# Patient Record
Sex: FEMALE | Race: WHITE | Employment: OTHER | URBAN - METROPOLITAN AREA
[De-identification: names, ages, dates, MRNs, and addresses within clinical notes are randomized per-mention and may not be internally consistent; named-entity substitution may affect disease eponyms.]

---

## 2024-08-09 PROBLEM — F33.9 MAJOR DEPRESSIVE DISORDER, RECURRENT (CMS-HCC): Status: ACTIVE | Noted: 2024-08-09

## 2024-08-09 PROBLEM — I10 HYPERTENSION, ESSENTIAL: Status: ACTIVE | Noted: 2024-08-09

## 2024-08-09 PROBLEM — G30.9 ALZHEIMER'S DISEASE (MULTI): Status: ACTIVE | Noted: 2024-08-09

## 2024-08-09 PROBLEM — F02.80 ALZHEIMER'S DISEASE (MULTI): Status: ACTIVE | Noted: 2024-08-09

## 2024-08-09 PROBLEM — M62.81 MUSCLE WEAKNESS (GENERALIZED): Status: ACTIVE | Noted: 2024-08-09

## 2024-08-09 PROBLEM — R41.81 AGE-RELATED COGNITIVE DECLINE: Status: ACTIVE | Noted: 2024-08-09

## 2024-08-09 PROBLEM — R41.841 COGNITIVE COMMUNICATION DEFICIT: Status: ACTIVE | Noted: 2024-08-09

## 2024-08-09 PROBLEM — F41.9 ANXIETY DISORDER: Status: ACTIVE | Noted: 2024-08-09

## 2024-08-09 PROBLEM — R13.11 DYSPHAGIA, ORAL PHASE: Status: ACTIVE | Noted: 2024-08-09

## 2024-08-09 PROBLEM — M48.061 SPINAL STENOSIS, LUMBAR REGION, WITHOUT NEUROGENIC CLAUDICATION: Status: ACTIVE | Noted: 2024-08-09

## 2024-08-09 PROBLEM — Z74.1 NEED FOR ASSISTANCE WITH PERSONAL CARE: Status: ACTIVE | Noted: 2024-08-09

## 2024-08-09 PROBLEM — Z86.73 PERSONAL HISTORY OF TRANSIENT ISCHEMIC ATTACK (TIA), AND CEREBRAL INFARCTION WITHOUT RESIDUAL DEFICITS: Status: ACTIVE | Noted: 2024-08-09

## 2024-08-09 PROBLEM — M54.16 LUMBAR RADICULOPATHY: Status: ACTIVE | Noted: 2024-08-09

## 2024-08-09 PROBLEM — I73.00 RAYNAUD'S DISEASE WITHOUT GANGRENE: Status: ACTIVE | Noted: 2024-08-09

## 2024-08-09 PROBLEM — I69.354 HEMIPLEGIA AND HEMIPARESIS FOLLOWING CEREBRAL INFARCTION AFFECTING LEFT NON-DOMINANT SIDE (MULTI): Status: ACTIVE | Noted: 2024-08-09

## 2024-08-09 PROBLEM — R26.81 UNSTEADINESS ON FEET: Status: ACTIVE | Noted: 2024-08-09

## 2024-08-09 PROBLEM — R29.3 ABNORMAL POSTURE: Status: ACTIVE | Noted: 2024-08-09

## 2024-08-09 PROBLEM — M19.90 OSTEOARTHRITIS: Status: ACTIVE | Noted: 2024-08-09

## 2024-08-09 PROBLEM — K21.9 GERD WITHOUT ESOPHAGITIS: Status: ACTIVE | Noted: 2024-08-09

## 2024-08-09 PROBLEM — E11.40 TYPE 2 DIABETES MELLITUS WITH DIABETIC NEUROPATHY (MULTI): Status: ACTIVE | Noted: 2024-08-09

## 2024-08-09 PROBLEM — I63.9 CEREBRAL INFARCTION (MULTI): Status: ACTIVE | Noted: 2024-08-09

## 2024-12-20 ENCOUNTER — NURSING HOME VISIT (OUTPATIENT)
Facility: EXTERNAL LOCATION | Age: 84
End: 2024-12-20
Payer: MEDICARE

## 2024-12-20 DIAGNOSIS — G30.9 ALZHEIMER'S DISEASE: ICD-10-CM

## 2024-12-20 DIAGNOSIS — F02.80 ALZHEIMER'S DISEASE: ICD-10-CM

## 2024-12-20 DIAGNOSIS — E11.40 TYPE 2 DIABETES MELLITUS WITH DIABETIC NEUROPATHY, WITHOUT LONG-TERM CURRENT USE OF INSULIN: ICD-10-CM

## 2024-12-20 DIAGNOSIS — I10 HYPERTENSION, ESSENTIAL: Primary | ICD-10-CM

## 2024-12-20 PROCEDURE — 99305 1ST NF CARE MODERATE MDM 35: CPT | Performed by: INTERNAL MEDICINE

## 2024-12-20 NOTE — PROGRESS NOTES
Name: Genet Foote  YOB: 1940    INITIAL VISIT: [facility],     [facility] [place]   Patient seen and examined here at [facility]  She is awake and alert and pleasant at this time  She is able to answer some simple questions  She denies any major complaints at this time    REVIEW OF SYSTEMS:  Review of systems are negative except where noted in HPI.    There were no vitals taken for this visit.   Vitals reviewed and stable at this time  Physical Exam  Constitutional:       Appearance: Normal appearance.   HENT:      Head: Normocephalic and atraumatic.      Right Ear: External ear normal.      Left Ear: External ear normal.      Nose: Nose normal.      Mouth/Throat:      Mouth: Mucous membranes are moist.      Pharynx: Oropharynx is clear.   Eyes:      Extraocular Movements: Extraocular movements intact.      Conjunctiva/sclera: Conjunctivae normal.      Pupils: Pupils are equal, round, and reactive to light.   Cardiovascular:      Rate and Rhythm: Normal rate and regular rhythm.   Pulmonary:      Effort: Pulmonary effort is normal.      Breath sounds: Normal breath sounds.   Abdominal:      General: Abdomen is flat.      Palpations: Abdomen is soft.   Skin:     General: Skin is warm and dry.   Neurological:      General: No focal deficit present.      Mental Status: She is alert and oriented to person, place, and time.   Psychiatric:         Mood and Affect: Mood normal.         Behavior: Behavior normal.            CODE STATUS: Full code      No visits with results within 1 Week(s) from this visit.   Latest known visit with results is:   No results found for any previous visit.       LABORATORIES OR DIAGNOSTIC TESTS DONE/REVIEWED IN FACILITY:    Not on File      MEDICATIONS RECONCILED AT THE FACILITY:  Please see Nursing facility Medication EMR for full updated list.    Assessment/Plan    Problem List Items Addressed This Visit       Type 2 diabetes mellitus with diabetic neuropathy (Multi)    Alzheimer's  disease    Hypertension, essential - Primary   Plan:  At this time vitals are stable she appears to be on appropriate medications and seems to be tolerating them well  She is a full code  We will continue her medical management as she is on  Continue supportive measures        Ruben Jones, DO

## 2024-12-20 NOTE — LETTER
Patient: Genet Foote  : 1940    Encounter Date: 2024    Name: Genet Foote  YOB: 1940    INITIAL VISIT: [facility],     [facility]   Patient seen and examined here at [facility]  She is awake and alert and pleasant at this time  She is able to answer some simple questions  She denies any major complaints at this time    REVIEW OF SYSTEMS:  Review of systems are negative except where noted in HPI.    There were no vitals taken for this visit.   Vitals reviewed and stable at this time  Physical Exam  Constitutional:       Appearance: Normal appearance.   HENT:      Head: Normocephalic and atraumatic.      Right Ear: External ear normal.      Left Ear: External ear normal.      Nose: Nose normal.      Mouth/Throat:      Mouth: Mucous membranes are moist.      Pharynx: Oropharynx is clear.   Eyes:      Extraocular Movements: Extraocular movements intact.      Conjunctiva/sclera: Conjunctivae normal.      Pupils: Pupils are equal, round, and reactive to light.   Cardiovascular:      Rate and Rhythm: Normal rate and regular rhythm.   Pulmonary:      Effort: Pulmonary effort is normal.      Breath sounds: Normal breath sounds.   Abdominal:      General: Abdomen is flat.      Palpations: Abdomen is soft.   Skin:     General: Skin is warm and dry.   Neurological:      General: No focal deficit present.      Mental Status: She is alert and oriented to person, place, and time.   Psychiatric:         Mood and Affect: Mood normal.         Behavior: Behavior normal.            CODE STATUS: Full code      No visits with results within 1 Week(s) from this visit.   Latest known visit with results is:   No results found for any previous visit.       LABORATORIES OR DIAGNOSTIC TESTS DONE/REVIEWED IN FACILITY:    Not on File      MEDICATIONS RECONCILED AT THE FACILITY:  Please see Nursing facility Medication EMR for full updated list.    Assessment/Plan   Problem List Items Addressed This Visit       Type  2 diabetes mellitus with diabetic neuropathy (Multi)    Alzheimer's disease    Hypertension, essential - Primary   Plan:  At this time vitals are stable she appears to be on appropriate medications and seems to be tolerating them well  She is a full code  We will continue her medical management as she is on  Continue supportive measures        Ruben Jones DO       Electronically Signed By: Ruben Jones DO   12/20/24 10:52 AM

## 2025-02-25 ENCOUNTER — NURSING HOME VISIT (OUTPATIENT)
Facility: EXTERNAL LOCATION | Age: 85
End: 2025-02-25
Payer: MEDICARE

## 2025-02-25 DIAGNOSIS — E11.40 TYPE 2 DIABETES MELLITUS WITH DIABETIC NEUROPATHY, WITHOUT LONG-TERM CURRENT USE OF INSULIN: ICD-10-CM

## 2025-02-25 DIAGNOSIS — G30.9 ALZHEIMER'S DISEASE: ICD-10-CM

## 2025-02-25 DIAGNOSIS — F02.80 ALZHEIMER'S DISEASE: ICD-10-CM

## 2025-02-25 PROCEDURE — 99308 SBSQ NF CARE LOW MDM 20: CPT | Performed by: INTERNAL MEDICINE

## 2025-02-25 NOTE — PROGRESS NOTES
Name: Genet Foote  YOB: 1940    FOLLOW UP VISIT: SNF,     SUBJECTIVE:  Patient seen and examined here at [facility] for follow-up  She is resting comfortably in bed  She does wake up and answer some very simple questions  Denies any complaints at this time  REVIEW OF SYSTEMS:   All review of systems are negative unless otherwise stated above under subjective.    LABS REVIEWED AT FACILITY:       OBJECTIVE:  Vitals reviewed and stable  Physical Exam  Constitutional:       Appearance: Normal appearance.   HENT:      Head: Normocephalic and atraumatic.      Right Ear: External ear normal.      Left Ear: External ear normal.      Nose: Nose normal.      Mouth/Throat:      Mouth: Mucous membranes are moist.      Pharynx: Oropharynx is clear.   Eyes:      Extraocular Movements: Extraocular movements intact.      Conjunctiva/sclera: Conjunctivae normal.      Pupils: Pupils are equal, round, and reactive to light.   Cardiovascular:      Rate and Rhythm: Normal rate and regular rhythm.   Pulmonary:      Effort: Pulmonary effort is normal.      Breath sounds: Normal breath sounds.   Abdominal:      General: Abdomen is flat.      Palpations: Abdomen is soft.   Skin:     General: Skin is warm and dry.   Neurological:      General: No focal deficit present.      Mental Status: She is alert and oriented to person, place, and time.   Psychiatric:         Mood and Affect: Mood normal.         Behavior: Behavior normal.         Assessment/Plan   Problem List Items Addressed This Visit       Type 2 diabetes mellitus with diabetic neuropathy (Multi)    Alzheimer's disease   Plan:  At this time clinically she appears stable  Medications are reviewed  She is a full code  We will continue her medical management and supportive measures as she is on      Ruben Jones DO

## 2025-02-25 NOTE — LETTER
Patient: Genet Foote  : 1940    Encounter Date: 2025    Name: Genet Foote  YOB: 1940    FOLLOW UP VISIT: SNF,     SUBJECTIVE:  Patient seen and examined here at [facility] [place] for follow-up  She is resting comfortably in bed  She does wake up and answer some very simple questions  Denies any complaints at this time  REVIEW OF SYSTEMS:   All review of systems are negative unless otherwise stated above under subjective.    LABS REVIEWED AT FACILITY:       OBJECTIVE:  Vitals reviewed and stable  Physical Exam  Constitutional:       Appearance: Normal appearance.   HENT:      Head: Normocephalic and atraumatic.      Right Ear: External ear normal.      Left Ear: External ear normal.      Nose: Nose normal.      Mouth/Throat:      Mouth: Mucous membranes are moist.      Pharynx: Oropharynx is clear.   Eyes:      Extraocular Movements: Extraocular movements intact.      Conjunctiva/sclera: Conjunctivae normal.      Pupils: Pupils are equal, round, and reactive to light.   Cardiovascular:      Rate and Rhythm: Normal rate and regular rhythm.   Pulmonary:      Effort: Pulmonary effort is normal.      Breath sounds: Normal breath sounds.   Abdominal:      General: Abdomen is flat.      Palpations: Abdomen is soft.   Skin:     General: Skin is warm and dry.   Neurological:      General: No focal deficit present.      Mental Status: She is alert and oriented to person, place, and time.   Psychiatric:         Mood and Affect: Mood normal.         Behavior: Behavior normal.         Assessment/Plan  Problem List Items Addressed This Visit       Type 2 diabetes mellitus with diabetic neuropathy (Multi)    Alzheimer's disease   Plan:  At this time clinically she appears stable  Medications are reviewed  She is a full code  We will continue her medical management and supportive measures as she is on      Ruben Jones DO      Electronically Signed By: Ruben Jones DO   25  9:35 AM

## 2025-05-13 ENCOUNTER — NURSING HOME VISIT (OUTPATIENT)
Facility: EXTERNAL LOCATION | Age: 85
End: 2025-05-13
Payer: MEDICARE

## 2025-05-13 DIAGNOSIS — I10 HYPERTENSION, ESSENTIAL: Primary | ICD-10-CM

## 2025-05-13 DIAGNOSIS — E11.40 TYPE 2 DIABETES MELLITUS WITH DIABETIC NEUROPATHY, WITHOUT LONG-TERM CURRENT USE OF INSULIN: ICD-10-CM

## 2025-05-13 DIAGNOSIS — M62.81 MUSCLE WEAKNESS (GENERALIZED): ICD-10-CM

## 2025-05-13 DIAGNOSIS — F02.80 ALZHEIMER'S DISEASE: ICD-10-CM

## 2025-05-13 DIAGNOSIS — G30.9 ALZHEIMER'S DISEASE: ICD-10-CM

## 2025-05-13 PROCEDURE — 99308 SBSQ NF CARE LOW MDM 20: CPT | Performed by: INTERNAL MEDICINE

## 2025-05-13 NOTE — LETTER
Patient: Genet Foote  : 1940    Encounter Date: 2025    Name: Genet Foote  YOB: 1940    FOLLOW UP VISIT: SNF,     SUBJECTIVE:  Patient seen and examined here at [facility] for follow-up  She is resting comfortably in bed when I saw her  She is able to awaken and tell me her name and answer some simple questions  Denies any pain at this time    REVIEW OF SYSTEMS:   All review of systems are negative unless otherwise stated above under subjective.    LABS REVIEWED AT FACILITY:       OBJECTIVE:  Weight is down 7 pounds  Physical Exam  Constitutional:       Appearance: Normal appearance.   HENT:      Head: Normocephalic and atraumatic.      Right Ear: External ear normal.      Left Ear: External ear normal.      Nose: Nose normal.      Mouth/Throat:      Mouth: Mucous membranes are moist.      Pharynx: Oropharynx is clear.   Eyes:      Extraocular Movements: Extraocular movements intact.      Conjunctiva/sclera: Conjunctivae normal.      Pupils: Pupils are equal, round, and reactive to light.   Cardiovascular:      Rate and Rhythm: Normal rate and regular rhythm.   Pulmonary:      Effort: Pulmonary effort is normal.      Breath sounds: Normal breath sounds.   Abdominal:      General: Abdomen is flat.      Palpations: Abdomen is soft.   Skin:     General: Skin is warm and dry.   Neurological:      General: No focal deficit present.      Mental Status: She is alert and oriented to person, place, and time.   Psychiatric:         Mood and Affect: Mood normal.         Behavior: Behavior normal.         Assessment/Plan  Problem List Items Addressed This Visit       Type 2 diabetes mellitus with diabetic neuropathy (Multi)    Muscle weakness (generalized)    Alzheimer's disease    Hypertension, essential - Primary   Plan:  Full code  She seems to be stable and seems to be doing well with no major complaints at this time  Medications reviewed  Vitals reviewed and stable  Continue  supportive measures currently as she is on      Ruben Jones DO    Electronically Signed By: Ruben Jones DO   5/13/25 10:01 AM

## 2025-05-13 NOTE — PROGRESS NOTES
Name: Genet Foote  YOB: 1940    FOLLOW UP VISIT: SNF,     SUBJECTIVE:  Patient seen and examined here at [facility] for follow-up  She is resting comfortably in bed when I saw her  She is able to awaken and tell me her name and answer some simple questions  Denies any pain at this time    REVIEW OF SYSTEMS:   All review of systems are negative unless otherwise stated above under subjective.    LABS REVIEWED AT FACILITY:       OBJECTIVE:  Weight is down 7 pounds  Physical Exam  Constitutional:       Appearance: Normal appearance.   HENT:      Head: Normocephalic and atraumatic.      Right Ear: External ear normal.      Left Ear: External ear normal.      Nose: Nose normal.      Mouth/Throat:      Mouth: Mucous membranes are moist.      Pharynx: Oropharynx is clear.   Eyes:      Extraocular Movements: Extraocular movements intact.      Conjunctiva/sclera: Conjunctivae normal.      Pupils: Pupils are equal, round, and reactive to light.   Cardiovascular:      Rate and Rhythm: Normal rate and regular rhythm.   Pulmonary:      Effort: Pulmonary effort is normal.      Breath sounds: Normal breath sounds.   Abdominal:      General: Abdomen is flat.      Palpations: Abdomen is soft.   Skin:     General: Skin is warm and dry.   Neurological:      General: No focal deficit present.      Mental Status: She is alert and oriented to person, place, and time.   Psychiatric:         Mood and Affect: Mood normal.         Behavior: Behavior normal.         Assessment/Plan   Problem List Items Addressed This Visit       Type 2 diabetes mellitus with diabetic neuropathy (Multi)    Muscle weakness (generalized)    Alzheimer's disease    Hypertension, essential - Primary   Plan:  Full code  She seems to be stable and seems to be doing well with no major complaints at this time  Medications reviewed  Vitals reviewed and stable  Continue supportive measures currently as she is on      Ruben Jones DO

## 2025-07-29 ENCOUNTER — NURSING HOME VISIT (OUTPATIENT)
Facility: EXTERNAL LOCATION | Age: 85
End: 2025-07-29
Payer: MEDICARE

## 2025-07-29 DIAGNOSIS — E11.40 TYPE 2 DIABETES MELLITUS WITH DIABETIC NEUROPATHY, WITHOUT LONG-TERM CURRENT USE OF INSULIN: ICD-10-CM

## 2025-07-29 DIAGNOSIS — F02.80 ALZHEIMER'S DISEASE: ICD-10-CM

## 2025-07-29 DIAGNOSIS — M62.81 MUSCLE WEAKNESS (GENERALIZED): ICD-10-CM

## 2025-07-29 DIAGNOSIS — G30.9 ALZHEIMER'S DISEASE: ICD-10-CM

## 2025-07-29 DIAGNOSIS — I10 HYPERTENSION, ESSENTIAL: Primary | ICD-10-CM

## 2025-07-29 PROCEDURE — 99308 SBSQ NF CARE LOW MDM 20: CPT | Performed by: INTERNAL MEDICINE

## 2025-07-29 NOTE — LETTER
Patient: Genet Foote  : 1940    Encounter Date: 2025    Name: Genet Foote  YOB: 1940    FOLLOW UP VISIT: SNF,     SUBJECTIVE:  Patient seen and examined at [facility] follow-up  When I saw her she is sitting in the wheelchair.  She is looking asymptomatic  She shakes her head yes but does not answer any questions verbally and 5  REVIEW OF SYSTEMS:   All review of systems are negative unless otherwise stated above under subjective.    LABS REVIEWED AT FACILITY:       OBJECTIVE:  Blood pressure is 116/78  Physical Exam  Constitutional:       Appearance: Normal appearance.   HENT:      Head: Normocephalic and atraumatic.      Right Ear: External ear normal.      Left Ear: External ear normal.      Nose: Nose normal.      Mouth/Throat:      Mouth: Mucous membranes are moist.      Pharynx: Oropharynx is clear.     Eyes:      Extraocular Movements: Extraocular movements intact.      Conjunctiva/sclera: Conjunctivae normal.      Pupils: Pupils are equal, round, and reactive to light.       Cardiovascular:      Rate and Rhythm: Normal rate and regular rhythm.   Pulmonary:      Effort: Pulmonary effort is normal.      Breath sounds: Normal breath sounds.   Abdominal:      General: Abdomen is flat.      Palpations: Abdomen is soft.     Skin:     General: Skin is warm and dry.     Neurological:      General: No focal deficit present.      Mental Status: She is alert and oriented to person, place, and time.     Psychiatric:         Mood and Affect: Mood normal.         Behavior: Behavior normal.         Assessment/Plan  Problem List Items Addressed This Visit       Type 2 diabetes mellitus with diabetic neuropathy (Multi)    Muscle weakness (generalized)    Alzheimer's disease    Hypertension, essential - Primary   Plan:  Full code  At this time appears stable  Medications are reviewed on oral medications for her diabetes  Continue supportive measures as she is on      Ruben Jones  DO    Electronically Signed By: Ruben Jones DO   7/29/25  5:25 PM

## 2025-07-29 NOTE — PROGRESS NOTES
Name: Genet Foote  YOB: 1940    FOLLOW UP VISIT: SNF,     SUBJECTIVE:  Patient seen and examined at [facility] follow-up  When I saw her she is sitting in the wheelchair.  She is looking asymptomatic  She shakes her head yes but does not answer any questions verbally and 5  REVIEW OF SYSTEMS:   All review of systems are negative unless otherwise stated above under subjective.    LABS REVIEWED AT FACILITY:       OBJECTIVE:  Blood pressure is 116/78  Physical Exam  Constitutional:       Appearance: Normal appearance.   HENT:      Head: Normocephalic and atraumatic.      Right Ear: External ear normal.      Left Ear: External ear normal.      Nose: Nose normal.      Mouth/Throat:      Mouth: Mucous membranes are moist.      Pharynx: Oropharynx is clear.     Eyes:      Extraocular Movements: Extraocular movements intact.      Conjunctiva/sclera: Conjunctivae normal.      Pupils: Pupils are equal, round, and reactive to light.       Cardiovascular:      Rate and Rhythm: Normal rate and regular rhythm.   Pulmonary:      Effort: Pulmonary effort is normal.      Breath sounds: Normal breath sounds.   Abdominal:      General: Abdomen is flat.      Palpations: Abdomen is soft.     Skin:     General: Skin is warm and dry.     Neurological:      General: No focal deficit present.      Mental Status: She is alert and oriented to person, place, and time.     Psychiatric:         Mood and Affect: Mood normal.         Behavior: Behavior normal.         Assessment/Plan   Problem List Items Addressed This Visit       Type 2 diabetes mellitus with diabetic neuropathy (Multi)    Muscle weakness (generalized)    Alzheimer's disease    Hypertension, essential - Primary   Plan:  Full code  At this time appears stable  Medications are reviewed on oral medications for her diabetes  Continue supportive measures as she is on      Ruben Jones DO